# Patient Record
Sex: FEMALE
[De-identification: names, ages, dates, MRNs, and addresses within clinical notes are randomized per-mention and may not be internally consistent; named-entity substitution may affect disease eponyms.]

---

## 2024-03-08 PROBLEM — Z00.00 ENCOUNTER FOR PREVENTIVE HEALTH EXAMINATION: Status: ACTIVE | Noted: 2024-03-08

## 2024-03-12 ENCOUNTER — APPOINTMENT (OUTPATIENT)
Dept: CARDIOLOGY | Facility: CLINIC | Age: 64
End: 2024-03-12
Payer: MEDICAID

## 2024-03-12 VITALS
HEIGHT: 65 IN | BODY MASS INDEX: 27.82 KG/M2 | WEIGHT: 167 LBS | SYSTOLIC BLOOD PRESSURE: 148 MMHG | HEART RATE: 68 BPM | DIASTOLIC BLOOD PRESSURE: 90 MMHG

## 2024-03-12 DIAGNOSIS — E78.5 HYPERLIPIDEMIA, UNSPECIFIED: ICD-10-CM

## 2024-03-12 DIAGNOSIS — Z13.6 ENCOUNTER FOR SCREENING FOR CARDIOVASCULAR DISORDERS: ICD-10-CM

## 2024-03-12 DIAGNOSIS — Z78.9 OTHER SPECIFIED HEALTH STATUS: ICD-10-CM

## 2024-03-12 DIAGNOSIS — I10 ESSENTIAL (PRIMARY) HYPERTENSION: ICD-10-CM

## 2024-03-12 PROCEDURE — 93000 ELECTROCARDIOGRAM COMPLETE: CPT

## 2024-03-12 PROCEDURE — 99204 OFFICE O/P NEW MOD 45 MIN: CPT | Mod: 25

## 2024-03-12 RX ORDER — AMLODIPINE BESYLATE 10 MG/1
10 TABLET ORAL DAILY
Refills: 0 | Status: ACTIVE | COMMUNITY

## 2024-03-12 RX ORDER — ATORVASTATIN CALCIUM 20 MG/1
20 TABLET, FILM COATED ORAL
Refills: 0 | Status: ACTIVE | COMMUNITY

## 2024-03-12 RX ORDER — ATENOLOL 25 MG/1
25 TABLET ORAL DAILY
Refills: 0 | Status: ACTIVE | COMMUNITY

## 2024-03-12 RX ORDER — ALENDRONATE SODIUM 35 MG/1
35 TABLET ORAL
Refills: 0 | Status: ACTIVE | COMMUNITY

## 2024-03-12 NOTE — DISCUSSION/SUMMARY
[FreeTextEntry1] : Referred by her PCP for evaluation. No cardiac history.  BP is elevated today Asymptomatic from a cardiac standpoint   Continue Amlodipine and Atenolol Follow-up with Dr. Irving for BP management She wishes to establish care with a cardiologist at [facility] for convenience / closer to home

## 2024-03-12 NOTE — HISTORY OF PRESENT ILLNESS
[FreeTextEntry1] : 64 F with HTN, HLD. No cardiac history.  TTE last year at [facility] (report not available).  No CP, SOB, palpitations, orthopnea/PND, dizziness or syncope.  She does not know why she was referred for cardiac evaluation.   EKG (3/12/2024):  NSR, no ST-T changes  2/2024 Chol  198 LDL  95 HDL  84 Trig  93

## 2024-03-12 NOTE — HISTORY OF PRESENT ILLNESS
[FreeTextEntry1] : 64 F with HTN, HLD. No cardiac history.  TTE last year at [facility] (report not available).  No CP, SOB, palpitations, orthopnea/PND, dizziness or syncope.  She does not know why she was referred for cardiac evaluation.   EKG (3/12/2024):  NSR, no ST-T changes  2/2024 Chol  198 LDL  95 HDL  84 Trig  93

## 2024-03-12 NOTE — DISCUSSION/SUMMARY
[FreeTextEntry1] : Referred by her PCP for evaluation. No cardiac history.  BP is elevated today Asymptomatic from a cardiac standpoint   Continue Amlodipine and Atenolol Follow-up with Dr. Irving for BP management She wishes to establish care with a cardiologist at [facility] [address] for convenience / closer to home

## 2024-03-12 NOTE — PHYSICAL EXAM
[Well Developed] : well developed [No Acute Distress] : no acute distress [No Carotid Bruit] : no carotid bruit [Normal Conjunctiva] : normal conjunctiva [No Murmur] : no murmur [Normal S1, S2] : normal S1, S2 [No Rub] : no rub [Clear Lung Fields] : clear lung fields [No Respiratory Distress] : no respiratory distress  [Good Air Entry] : good air entry [Soft] : abdomen soft [Non Tender] : non-tender [Normal Gait] : normal gait [No Edema] : no edema [No Cyanosis] : no cyanosis [No Rash] : no rash [No Skin Lesions] : no skin lesions [No Focal Deficits] : no focal deficits [Moves all extremities] : moves all extremities [Alert and Oriented] : alert and oriented

## 2024-03-12 NOTE — PHYSICAL EXAM
[No Acute Distress] : no acute distress [Well Developed] : well developed [No Carotid Bruit] : no carotid bruit [Normal Conjunctiva] : normal conjunctiva [No Murmur] : no murmur [Normal S1, S2] : normal S1, S2 [No Rub] : no rub [Clear Lung Fields] : clear lung fields [Good Air Entry] : good air entry [No Respiratory Distress] : no respiratory distress  [Soft] : abdomen soft [Non Tender] : non-tender [Normal Gait] : normal gait [No Cyanosis] : no cyanosis [No Rash] : no rash [No Edema] : no edema [No Skin Lesions] : no skin lesions [No Focal Deficits] : no focal deficits [Moves all extremities] : moves all extremities [Alert and Oriented] : alert and oriented